# Patient Record
Sex: FEMALE | Race: WHITE
[De-identification: names, ages, dates, MRNs, and addresses within clinical notes are randomized per-mention and may not be internally consistent; named-entity substitution may affect disease eponyms.]

---

## 2023-04-06 ENCOUNTER — HOSPITAL ENCOUNTER (INPATIENT)
Dept: HOSPITAL 19 - COL.ER | Age: 73
LOS: 2 days | Discharge: HOME | DRG: 287 | End: 2023-04-08
Attending: HOSPITALIST | Admitting: HOSPITALIST
Payer: MEDICARE

## 2023-04-06 VITALS — WEIGHT: 166.23 LBS | HEIGHT: 65.98 IN | BODY MASS INDEX: 26.71 KG/M2

## 2023-04-06 DIAGNOSIS — F95.2: ICD-10-CM

## 2023-04-06 DIAGNOSIS — Z79.01: ICD-10-CM

## 2023-04-06 DIAGNOSIS — I48.91: Primary | ICD-10-CM

## 2023-04-06 DIAGNOSIS — J44.9: ICD-10-CM

## 2023-04-06 DIAGNOSIS — E83.42: ICD-10-CM

## 2023-04-06 DIAGNOSIS — E78.5: ICD-10-CM

## 2023-04-06 DIAGNOSIS — Z20.822: ICD-10-CM

## 2023-04-06 DIAGNOSIS — E03.9: ICD-10-CM

## 2023-04-06 DIAGNOSIS — B97.81: ICD-10-CM

## 2023-04-06 DIAGNOSIS — J45.901: ICD-10-CM

## 2023-04-06 DIAGNOSIS — E87.6: ICD-10-CM

## 2023-04-06 DIAGNOSIS — M81.0: ICD-10-CM

## 2023-04-06 DIAGNOSIS — K21.9: ICD-10-CM

## 2023-04-06 DIAGNOSIS — K22.70: ICD-10-CM

## 2023-04-06 PROCEDURE — C1769 GUIDE WIRE: HCPCS

## 2023-04-07 VITALS — DIASTOLIC BLOOD PRESSURE: 79 MMHG | SYSTOLIC BLOOD PRESSURE: 122 MMHG | HEART RATE: 59 BPM

## 2023-04-07 VITALS — SYSTOLIC BLOOD PRESSURE: 138 MMHG | DIASTOLIC BLOOD PRESSURE: 69 MMHG | HEART RATE: 64 BPM

## 2023-04-07 VITALS — SYSTOLIC BLOOD PRESSURE: 115 MMHG | DIASTOLIC BLOOD PRESSURE: 93 MMHG | HEART RATE: 60 BPM

## 2023-04-07 VITALS — DIASTOLIC BLOOD PRESSURE: 54 MMHG | HEART RATE: 70 BPM | SYSTOLIC BLOOD PRESSURE: 140 MMHG | TEMPERATURE: 98 F

## 2023-04-07 VITALS — SYSTOLIC BLOOD PRESSURE: 104 MMHG | DIASTOLIC BLOOD PRESSURE: 49 MMHG | HEART RATE: 57 BPM

## 2023-04-07 VITALS — HEART RATE: 57 BPM | DIASTOLIC BLOOD PRESSURE: 57 MMHG | SYSTOLIC BLOOD PRESSURE: 130 MMHG

## 2023-04-07 VITALS — DIASTOLIC BLOOD PRESSURE: 54 MMHG | HEART RATE: 61 BPM | SYSTOLIC BLOOD PRESSURE: 126 MMHG | TEMPERATURE: 97.8 F

## 2023-04-07 VITALS — SYSTOLIC BLOOD PRESSURE: 126 MMHG | HEART RATE: 62 BPM | DIASTOLIC BLOOD PRESSURE: 54 MMHG

## 2023-04-07 VITALS — SYSTOLIC BLOOD PRESSURE: 131 MMHG

## 2023-04-07 VITALS — HEART RATE: 59 BPM | SYSTOLIC BLOOD PRESSURE: 137 MMHG | DIASTOLIC BLOOD PRESSURE: 57 MMHG

## 2023-04-07 VITALS — TEMPERATURE: 98.1 F | DIASTOLIC BLOOD PRESSURE: 70 MMHG | SYSTOLIC BLOOD PRESSURE: 152 MMHG | HEART RATE: 138 BPM

## 2023-04-07 VITALS — SYSTOLIC BLOOD PRESSURE: 132 MMHG | TEMPERATURE: 97.6 F | HEART RATE: 61 BPM | DIASTOLIC BLOOD PRESSURE: 57 MMHG

## 2023-04-07 VITALS — DIASTOLIC BLOOD PRESSURE: 46 MMHG | SYSTOLIC BLOOD PRESSURE: 142 MMHG | HEART RATE: 64 BPM

## 2023-04-07 VITALS — SYSTOLIC BLOOD PRESSURE: 123 MMHG | HEART RATE: 70 BPM | DIASTOLIC BLOOD PRESSURE: 63 MMHG | TEMPERATURE: 97.9 F

## 2023-04-07 VITALS — HEART RATE: 64 BPM | DIASTOLIC BLOOD PRESSURE: 57 MMHG | SYSTOLIC BLOOD PRESSURE: 118 MMHG | TEMPERATURE: 97.5 F

## 2023-04-07 VITALS — DIASTOLIC BLOOD PRESSURE: 45 MMHG | HEART RATE: 67 BPM | SYSTOLIC BLOOD PRESSURE: 109 MMHG

## 2023-04-07 VITALS — HEART RATE: 58 BPM | SYSTOLIC BLOOD PRESSURE: 122 MMHG | DIASTOLIC BLOOD PRESSURE: 64 MMHG

## 2023-04-07 VITALS — HEART RATE: 104 BPM | SYSTOLIC BLOOD PRESSURE: 137 MMHG | TEMPERATURE: 98 F | DIASTOLIC BLOOD PRESSURE: 95 MMHG

## 2023-04-07 VITALS — DIASTOLIC BLOOD PRESSURE: 73 MMHG | SYSTOLIC BLOOD PRESSURE: 126 MMHG | HEART RATE: 66 BPM

## 2023-04-07 VITALS — HEART RATE: 58 BPM | DIASTOLIC BLOOD PRESSURE: 61 MMHG | SYSTOLIC BLOOD PRESSURE: 119 MMHG

## 2023-04-07 VITALS — HEART RATE: 66 BPM | DIASTOLIC BLOOD PRESSURE: 67 MMHG | SYSTOLIC BLOOD PRESSURE: 131 MMHG

## 2023-04-07 LAB
ALBUMIN SERPL-MCNC: 3.8 GM/DL (ref 3.4–4.8)
ALP SERPL-CCNC: 67 U/L (ref 40–150)
ALT SERPL-CCNC: 16 U/L (ref 0–55)
ANION GAP SERPL CALC-SCNC: 12 MMOL/L (ref 7–16)
ANION GAP SERPL CALC-SCNC: 8 MMOL/L (ref 7–16)
APTT PPP: 27.9 SECONDS (ref 26–37)
AST SERPL-CCNC: 18 U/L (ref 5–34)
BASOPHILS # BLD: 0.1 K/MM3 (ref 0–0.2)
BASOPHILS # BLD: 0.1 K/MM3 (ref 0–0.2)
BASOPHILS NFR BLD AUTO: 0.7 % (ref 0–2)
BASOPHILS NFR BLD AUTO: 0.9 % (ref 0–2)
BILIRUB SERPL-MCNC: 0.3 MG/DL (ref 0.2–1.2)
BUN SERPL-MCNC: 11 MG/DL (ref 10–20)
BUN SERPL-MCNC: 13 MG/DL (ref 10–20)
CALCIUM SERPL-MCNC: 8.6 MG/DL (ref 8.4–10.2)
CALCIUM SERPL-MCNC: 9.3 MG/DL (ref 8.4–10.2)
CHLORIDE SERPL-SCNC: 107 MMOL/L (ref 98–107)
CHLORIDE SERPL-SCNC: 110 MMOL/L (ref 98–107)
CHOLEST SPEC-SCNC: 158 MG/DL (ref 0–199)
CHOLEST/HDLC SERPL-SRTO: 2.5
CO2 SERPL-SCNC: 22 MMOL/L (ref 23–31)
CO2 SERPL-SCNC: 23 MMOL/L (ref 23–31)
CREAT SERPL-SCNC: 0.82 MG/DL (ref 0.57–1.11)
CREAT SERPL-SCNC: 1.01 MG/DL (ref 0.57–1.11)
EOSINOPHIL # BLD: 0.1 K/MM3 (ref 0–0.7)
EOSINOPHIL # BLD: 0.3 K/MM3 (ref 0–0.7)
EOSINOPHIL NFR BLD: 0.8 % (ref 0–4)
EOSINOPHIL NFR BLD: 3.6 % (ref 0–4)
ERYTHROCYTE [DISTWIDTH] IN BLOOD BY AUTOMATED COUNT: 13.1 % (ref 11.5–14.5)
ERYTHROCYTE [DISTWIDTH] IN BLOOD BY AUTOMATED COUNT: 13.2 % (ref 11.5–14.5)
GLUCOSE SERPL-MCNC: 115 MG/DL (ref 70–99)
GLUCOSE SERPL-MCNC: 158 MG/DL (ref 70–99)
GRANULOCYTES # BLD AUTO: 59.2 % (ref 42.2–75.2)
GRANULOCYTES # BLD AUTO: 78.6 % (ref 42.2–75.2)
HCT VFR BLD AUTO: 41.7 % (ref 37–47)
HCT VFR BLD AUTO: 43.5 % (ref 37–47)
HDLC SERPL-MCNC: 62 MG/DL (ref 40–60)
HGB BLD-MCNC: 13.9 G/DL (ref 12.5–16)
HGB BLD-MCNC: 14.4 G/DL (ref 12.5–16)
INR BLD: 1 (ref 0.8–3)
LDLC SERPL-MCNC: 89 MG/DL
LYMPHOCYTES # BLD: 1.3 K/MM3 (ref 1.2–3.4)
LYMPHOCYTES # BLD: 1.9 K/MM3 (ref 1.2–3.4)
LYMPHOCYTES NFR BLD: 16.3 % (ref 20–51)
LYMPHOCYTES NFR BLD: 23.8 % (ref 20–51)
MCH RBC QN AUTO: 28 PG (ref 27–31)
MCH RBC QN AUTO: 28 PG (ref 27–31)
MCHC RBC AUTO-ENTMCNC: 33 G/DL (ref 33–37)
MCHC RBC AUTO-ENTMCNC: 33 G/DL (ref 33–37)
MCV RBC AUTO: 84 FL (ref 80–100)
MCV RBC AUTO: 84 FL (ref 80–100)
MONOCYTES # BLD: 0.3 K/MM3 (ref 0.1–0.6)
MONOCYTES # BLD: 1 K/MM3 (ref 0.1–0.6)
MONOCYTES NFR BLD AUTO: 12.3 % (ref 1.7–9.3)
MONOCYTES NFR BLD AUTO: 3.3 % (ref 1.7–9.3)
NEUTROPHILS # BLD: 4.8 K/MM3 (ref 1.4–6.5)
NEUTROPHILS # BLD: 6 K/MM3 (ref 1.4–6.5)
PLATELET # BLD AUTO: 230 K/MM3 (ref 130–400)
PLATELET # BLD AUTO: 245 K/MM3 (ref 130–400)
PMV BLD AUTO: 9.4 FL (ref 7.4–10.4)
PMV BLD AUTO: 9.8 FL (ref 7.4–10.4)
POTASSIUM SERPL-SCNC: 3.4 MMOL/L (ref 3.5–4.5)
POTASSIUM SERPL-SCNC: 4.2 MMOL/L (ref 3.5–4.5)
PROT SERPL-MCNC: 7 GM/DL (ref 6.2–8.1)
PROTHROMBIN TIME: 11.6 SECONDS (ref 9.7–12.8)
RBC # BLD AUTO: 4.99 M/MM3 (ref 4.1–5.3)
RBC # BLD AUTO: 5.16 M/MM3 (ref 4.1–5.3)
SODIUM SERPL-SCNC: 140 MMOL/L (ref 136–145)
SODIUM SERPL-SCNC: 142 MMOL/L (ref 136–145)
TRIGL SERPL-MCNC: 35 MG/DL (ref 0–149)
TROPONIN I SERPL-MCNC: 0.01 NG/ML (ref 0–0.03)
TROPONIN I SERPL-MCNC: 0.02 NG/ML (ref 0–0.03)

## 2023-04-07 PROCEDURE — 4A023N8 MEASUREMENT OF CARDIAC SAMPLING AND PRESSURE, BILATERAL, PERCUTANEOUS APPROACH: ICD-10-PCS | Performed by: INTERNAL MEDICINE

## 2023-04-07 PROCEDURE — B2111ZZ FLUOROSCOPY OF MULTIPLE CORONARY ARTERIES USING LOW OSMOLAR CONTRAST: ICD-10-PCS | Performed by: INTERNAL MEDICINE

## 2023-04-07 NOTE — NUR
REMAINDER OF AIR REMOVED FROM RADIAL BAND, NO BLEEDING NOTED. BANDAID PLACED.
PULSES PALPABLE. NO HEMATOMA.

## 2023-04-07 NOTE — NUR
SEE MERGE FOR  ALL MEDICATIONS, VITAL SIGNS  AND INTERVENTIONS.  HEPARIN  AND
DILTIAZEM GTT STOPPED PER PHYSICIAN ORDER PRIOR TO PROCEDURE.

## 2023-04-07 NOTE — NUR
Admitted to medical floor from ER- DX afib/RVR, rate ,s B/P stable
130-140SBP, Pt denies pain or SOB, Up to bathroom- steady on feet, has Heparin
drip at 14cc/hr and Cardizem drip at 15cc/hr {15 mg/hr},, Geting IV potassium
and IV magnesium and also starting on some IV antibiotics empirically- pt does
have a cough/stuffiness, covid neg, flu negative.

## 2023-04-07 NOTE — NUR
DR. CRAMONA NOTIFIED OF CARDIOLOGY CONSULT. PER DR. CARMONA, PATIENT TO STAY ON
CARDIZEM DRIP FOR NOW, CARDIOLOGY WILL COME ASSESS.

## 2023-04-07 NOTE — NUR
PER DR. CARMONA, RN TO DECREASE CARDIZEM DRIP FROM 15ML/HR TO 5ML/HR. VITAL
SIGNS REMAIN STABLE. GOING TO CATH LAB THIS AFTERNOON. HEPARIN ON HOLD UNTIL
RESULTS OF HEP XA ARE OBTAINED.

## 2023-04-07 NOTE — NUR
Initial visit; Patient states she is feeling good today and waiting for her
 to get here.  wished patient God's blessings and a Happy
Easter.

## 2023-04-07 NOTE — NUR
PT APPEARS TO BE SLEEPING UPON ENTERING. PT STATES SHE HASNT HAD ANY DECENT
SLEEP SINCE ADMISSION. PT HAS NO COMPLAINTS OF PAIN/DISCOMFORT. ASSESSMENT
DONE, SEE INTERVENTIONS. CARDIZEM DRIP, HEPARIN DRIP, AND DOXYCYCLINE RUNNING
PER ORDER. PT HAS NO OTHER COMPLAINTS AT THIS TIME, CALL LIGHT WITHIN REACH,
BED IN LOWEST POSITION.

## 2023-04-07 NOTE — NUR
PATIENT RETURNED FROM CATH LAB AT APPROX 1245. POST OP VITAL SIGNS INITATED,
VITAL SIGNS STABLE. PULSES STRONG THROUGHOUT. RIGHT RADIAL BAND ON, NO
BLEEDING NOTED. PATIENT EDUCATION PROVIDED ON IMPORTANCE OF NOT BENDING OR
LIFTING WITH RIGHT ARM.

## 2023-04-07 NOTE — NUR
SHIFT ASSESSMENT COMPLETED. PATIENT IS ALERT AND ORIENTED X4. DENIES PAIN.
EXPIRATORY WHEEZE THROUGHOUT. ON CARDIZEM DRIP AT 15ML/HR, HEPARIN AT 14ML/HR,
TOLERATING WELL. NEXT HEP XA SCHEDULED FOR 0900. CARDIOLOGY PAGED AT 0800,
AWAITING RETURN CALL. VITAL SIGNS STABLE. DENIES NEEDS. CALL LIGHT WITHIN
REACH.

## 2023-04-07 NOTE — NUR
Did let Lynnette SERRA know
that pt is in sinus and rate 65/min, B/P stable,, states
to leave Cardizem drip at the 15cc/hr at this time-

## 2023-04-08 VITALS — HEART RATE: 69 BPM | SYSTOLIC BLOOD PRESSURE: 122 MMHG | TEMPERATURE: 97.7 F | DIASTOLIC BLOOD PRESSURE: 60 MMHG

## 2023-04-08 VITALS — SYSTOLIC BLOOD PRESSURE: 133 MMHG | TEMPERATURE: 97.9 F | HEART RATE: 68 BPM | DIASTOLIC BLOOD PRESSURE: 75 MMHG

## 2023-04-08 VITALS — SYSTOLIC BLOOD PRESSURE: 125 MMHG | HEART RATE: 67 BPM | TEMPERATURE: 98.6 F | DIASTOLIC BLOOD PRESSURE: 63 MMHG

## 2023-04-08 VITALS — SYSTOLIC BLOOD PRESSURE: 133 MMHG

## 2023-04-08 NOTE — NUR
TELEMETRY REMOVED FROM PATIENT, DISCHARGE INSTRUCTIONS ABOUT FOLLOW UP
APPOINTMENTS PROVIDED AND EDUCATION REGARDING NEW MEDICATIONS GIVEN WITH
HANDOUTS. PATIENT VERBALIZED UNDERSTANDING OF EDUCATION AND IS DRESSED IN
PERSONAL CLOTHES WAITING FOR

## 2023-04-08 NOTE — NUR
PATIENT AWAKE IN BED UPON ENTERING. PT EAGER TO GO HOME AND HAS NO COMPLAINTS
AT THIS TIME. ASSESSMENT DONE, SEE INTERVENTIONS. CALL LIGHT WITHIN REACH, BED
IN LOWEST POSITION.

## 2023-04-08 NOTE — NUR
PT.'S RADIAL COMPRESSION BAND WAS REMOVED BEFORE SHIFT CHANGE EARLIER,
DRESSING CONTINUES TO REMAIN C/D/I, PULSE'S PALPABLE, PT. DENIES PAIN IN THAT
WRIST, WILL CONTINUE TO MONITOR.

## 2023-04-08 NOTE — NUR
THIS RN OVERSAW ANA RN DURING DISCHARGE INSTRUCTIONS AND PT CARE. BOTH IV'S
WERE REMOVED FOR PENDING D/C. WAITING ON  TO ARRIVE.